# Patient Record
Sex: FEMALE | Race: BLACK OR AFRICAN AMERICAN | URBAN - METROPOLITAN AREA
[De-identification: names, ages, dates, MRNs, and addresses within clinical notes are randomized per-mention and may not be internally consistent; named-entity substitution may affect disease eponyms.]

---

## 2024-04-09 ENCOUNTER — OV NP (OUTPATIENT)
Dept: URBAN - METROPOLITAN AREA CLINIC 84 | Facility: CLINIC | Age: 74
End: 2024-04-09
Payer: COMMERCIAL

## 2024-04-09 VITALS
TEMPERATURE: 97.3 F | WEIGHT: 145.4 LBS | SYSTOLIC BLOOD PRESSURE: 124 MMHG | HEART RATE: 61 BPM | BODY MASS INDEX: 26.76 KG/M2 | HEIGHT: 62 IN | DIASTOLIC BLOOD PRESSURE: 74 MMHG

## 2024-04-09 DIAGNOSIS — Z86.010 PERSONAL HISTORY OF COLONIC POLYPS: ICD-10-CM

## 2024-04-09 PROBLEM — 428283002: Status: ACTIVE | Noted: 2024-04-09

## 2024-04-09 PROCEDURE — 993 APS NON BILLABLE: Performed by: INTERNAL MEDICINE

## 2024-04-09 RX ORDER — ROSUVASTATIN CALCIUM 40 MG/1
TAKE 1 TABLET BY MOUTH EVERY DAY TABLET, FILM COATED ORAL
Qty: 90 EACH | Refills: 1 | Status: ACTIVE | COMMUNITY

## 2024-04-09 RX ORDER — TRAMADOL HYDROCHLORIDE 50 MG/1
TAKE 1 TABLET BY MOUTH EVERY 6 HOURS TABLET, FILM COATED ORAL
Qty: 30 EACH | Refills: 0 | Status: ACTIVE | COMMUNITY

## 2024-04-09 RX ORDER — GLIPIZIDE 10 MG/1
TAKE 1 TABLET BY MOUTH EVERYDAY WITH BREAKFAST TABLET, FILM COATED, EXTENDED RELEASE ORAL
Qty: 90 EACH | Refills: 0 | Status: ACTIVE | COMMUNITY

## 2024-04-09 RX ORDER — OLMESARTAN MEDOXOMIL AND HYDROCHLOROTHIAZIDE 40; 25 MG/1; MG/1
TAKE 1 TABLET BY MOUTH EVERY DAY TABLET ORAL
Qty: 90 EACH | Refills: 0 | Status: ACTIVE | COMMUNITY

## 2024-04-09 NOTE — HPI-TODAY'S VISIT:
72 yo pt with h/o of colon polypsx7 about 10 yrs ago  presents for evaluation for colonoscopy. Denies colon issues.

## 2024-04-24 ENCOUNTER — COLON (OUTPATIENT)
Dept: URBAN - METROPOLITAN AREA SURGERY CENTER 20 | Facility: SURGERY CENTER | Age: 74
End: 2024-04-24

## 2024-04-24 ENCOUNTER — LAB (OUTPATIENT)
Dept: URBAN - METROPOLITAN AREA CLINIC 4 | Facility: CLINIC | Age: 74
End: 2024-04-24
Payer: COMMERCIAL

## 2024-04-24 DIAGNOSIS — D12.4 BENIGN NEOPLASM OF DESCENDING COLON: ICD-10-CM

## 2024-04-24 PROCEDURE — 88305 TISSUE EXAM BY PATHOLOGIST: CPT | Performed by: PATHOLOGY

## 2024-05-08 ENCOUNTER — TELEPHONE ENCOUNTER (OUTPATIENT)
Dept: URBAN - METROPOLITAN AREA CLINIC 84 | Facility: CLINIC | Age: 74
End: 2024-05-08